# Patient Record
Sex: MALE | Race: WHITE | Employment: STUDENT | URBAN - METROPOLITAN AREA
[De-identification: names, ages, dates, MRNs, and addresses within clinical notes are randomized per-mention and may not be internally consistent; named-entity substitution may affect disease eponyms.]

---

## 2021-01-21 ENCOUNTER — OFFICE VISIT (OUTPATIENT)
Dept: URGENT CARE | Facility: URGENT CARE | Age: 21
End: 2021-01-21
Payer: COMMERCIAL

## 2021-01-21 VITALS
DIASTOLIC BLOOD PRESSURE: 74 MMHG | SYSTOLIC BLOOD PRESSURE: 143 MMHG | TEMPERATURE: 97.4 F | OXYGEN SATURATION: 99 % | BODY MASS INDEX: 23.62 KG/M2 | HEIGHT: 75 IN | HEART RATE: 89 BPM | WEIGHT: 190 LBS

## 2021-01-21 DIAGNOSIS — S01.81XA CHIN LACERATION, INITIAL ENCOUNTER: Primary | ICD-10-CM

## 2021-01-21 PROCEDURE — 12011 RPR F/E/E/N/L/M 2.5 CM/<: CPT | Performed by: PHYSICIAN ASSISTANT

## 2021-01-21 RX ORDER — ALBUTEROL SULFATE 90 UG/1
AEROSOL, METERED RESPIRATORY (INHALATION)
COMMUNITY
Start: 2020-07-28

## 2021-01-21 ASSESSMENT — MIFFLIN-ST. JEOR: SCORE: 1962.46

## 2021-01-22 NOTE — PATIENT INSTRUCTIONS
Vitamin E to help scarring.   Sunscreen  Patient Education     Chin Laceration, Stitches or Tape  A laceration is a cut through the skin. If it's deep, it may need stitches. Minor cuts may be treated with surgical tape, skin glue, or other basic dressings. You may need a tetanus shot if you are not up to date on your tetanus vaccine.   Home care  These guidelines will help as your laceration heals:    If a bandage was applied and it becomes wet or dirty, replace it. Otherwise, leave it in place for the first 24 hours, then change it once a day or as directed.    If stitches were used, clean the wound daily:  ? Wash the area with soap and water. Use water on a cotton swab to loosen and remove any blood or crust that forms.  ? After cleaning, keep the wound clean and dry. Talk with your doctor before applying any antibiotic ointment to the wound.  ? You may shower as usual after the first 24 hours. But don't soak the area in water until the sutures are removed. This means no tub baths or swimming.    If surgical tape was used, keep the area clean and dry. If it becomes wet, blot it dry with a towel.    Your doctor may prescribe or recommend an antibiotic cream or ointment to prevent infection. Don't stop taking this medicine until you have finished the prescribed course or your doctor tells you to stop. Your doctor may also prescribe medicines for pain. Follow the doctor s instructions for taking these medicines. You may use over-the-counter pain medicine if no other pain medicine was prescribed. Talk with your doctor before using these medicines if you have chronic liver or kidney disease. Also talk with your doctor if you have ever had a stomach ulcer or gastrointestinal bleeding.    Certain types of skin glues can't be used if you have an allergy to latex or formaldehyde. Tell your doctor right away about any allergies.  Follow-up care  Follow up with your healthcare provider, or as advised. Most chin lacerations  heal within 5 to 7 days. But your wound may become infected even with proper treatment. You should check the wound daily for signs of infection listed below. Stitches should be removed from the chin within 5 days. If tape closures were used, remove them yourself if they have not fallen off after 5 days.   When to seek medical advice  Call your healthcare provider right away if any of these occur:     Pain in the wound that gets worse    Redness, swelling, or pus coming from the wound    Fever of 100.4 F (38 C) or higher, or as directed by your healthcare provider    Bleeding not controlled by direct pressure    Wound edges reopen    Sutures come apart or surgical tape falls off before 5 days  [employer] last reviewed this educational content on 8/1/2019 2000-2020 [email or web address] [address] [place] [address], [place] [address]. All rights reserved. This information is not intended as a substitute for professional medical care. Always follow your healthcare professional's instructions.

## 2021-01-22 NOTE — PROGRESS NOTES
"Chief Complaint   Patient presents with     Urgent Care     Pt in clinic to have eval for chin laceration     Laceration     Last tetanus 8/20/2012       ASSESSMENT/PLAN:  Petar was seen today for urgent care and laceration.    Diagnoses and all orders for this visit:    Chin laceration, initial encounter  -     REPAIR SUPERFICIAL, WOUND FACE/EAR <2.5 CM    Alcohol swab was used to clean around wound and the 2 ml of 1% lidocaine with epi was used to anesthestized the wound. chlorhexidine was then used to cleanse the wound. 5-0 Ethilon was used and 3 simple interrupted sutures were places with good edge approximation. 2 steri strips also applied to ends of wound.   The plan of care was discussed with the patient. They understand and agree with the course of treatment prescribed. A printed summary was given including instructions and medications.    Discussed wound care and signs of infection. Return in approx 8 days for removal.    SUBJECTIVE:  Petar is a 20 year old male who presents to urgent care with a chin lac after falling on the ice. He was skating. He denies any HA, nausea, vomiting, LOC.     ROS: Pertinent ROS neg other than the symptoms noted above in the HPI.     OBJECTIVE:  BP (!) 143/74   Pulse 89   Temp 97.4  F (36.3  C) (Oral)   Ht 1.905 m (6' 3\")   Wt 86.2 kg (190 lb)   SpO2 99%   BMI 23.75 kg/m     GENERAL: healthy, alert and no distress  SKIN: 2 cm semi circular avulsion, partial thickness on R chin. Normal sensation      Current Outpatient Medications   Medication     ADVAIR DISKUS 100-50 MCG/DOSE inhaler     albuterol (PROAIR HFA/PROVENTIL HFA/VENTOLIN HFA) 108 (90 Base) MCG/ACT inhaler     No current facility-administered medications for this visit.       There is no problem list on file for this patient.     No past medical history on file.  No past surgical history on file.  No family history on file.  Social History     Tobacco Use     Smoking status: Never Smoker     Smokeless tobacco: " Never Used   Substance Use Topics     Alcohol use: Not on file       Jason Trent PA-C    The use of Dragon/[employer] dictation services may have been used to construct the content in this note; any grammatical or spelling errors are non-intentional. Please contact the author of this note directly if you are in need of any clarification.

## 2021-01-22 NOTE — PROCEDURES
Alcohol swab was used to clean around wound and the 2 ml of 1% lidocaine with epi was used to anesthestized the wound. chlorhexidine was then used to cleanse the wound. 5-0 Ethilon was used and 3 simple interrupted sutures were places with good edge approximation. 2 steri strips also applied to ends of wound.